# Patient Record
Sex: FEMALE | Race: WHITE
[De-identification: names, ages, dates, MRNs, and addresses within clinical notes are randomized per-mention and may not be internally consistent; named-entity substitution may affect disease eponyms.]

---

## 2019-08-15 ENCOUNTER — HOSPITAL ENCOUNTER (OUTPATIENT)
Dept: HOSPITAL 8 - CFH | Age: 54
Discharge: HOME | End: 2019-08-15
Attending: ORTHOPAEDIC SURGERY
Payer: COMMERCIAL
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
## 2019-08-15 DIAGNOSIS — M22.41: ICD-10-CM
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
## 2019-08-15 DIAGNOSIS — S83.271A: ICD-10-CM

## 2019-08-15 DIAGNOSIS — S83.231A: Primary | ICD-10-CM
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
## 2019-08-23 ENCOUNTER — HOSPITAL ENCOUNTER (OUTPATIENT)
Facility: MEDICAL CENTER | Age: 54
End: 2019-08-23
Attending: ORTHOPAEDIC SURGERY | Admitting: ORTHOPAEDIC SURGERY
Payer: COMMERCIAL

## 2019-08-28 ENCOUNTER — APPOINTMENT (OUTPATIENT)
Dept: ADMISSIONS | Facility: MEDICAL CENTER | Age: 54
End: 2019-08-28
Attending: ORTHOPAEDIC SURGERY
Payer: COMMERCIAL

## 2019-08-28 VITALS — WEIGHT: 170.42 LBS | BODY MASS INDEX: 26.75 KG/M2 | HEIGHT: 67 IN

## 2019-08-28 DIAGNOSIS — Z01.812 PRE-OPERATIVE LABORATORY EXAMINATION: ICD-10-CM

## 2019-08-28 LAB
ERYTHROCYTE [DISTWIDTH] IN BLOOD BY AUTOMATED COUNT: 44.2 FL (ref 35.9–50)
HCT VFR BLD AUTO: 42.3 % (ref 37–47)
HGB BLD-MCNC: 14 G/DL (ref 12–16)
MCH RBC QN AUTO: 33.3 PG (ref 27–33)
MCHC RBC AUTO-ENTMCNC: 33.1 G/DL (ref 33.6–35)
MCV RBC AUTO: 100.5 FL (ref 81.4–97.8)
PLATELET # BLD AUTO: 329 K/UL (ref 164–446)
PMV BLD AUTO: 9.7 FL (ref 9–12.9)
RBC # BLD AUTO: 4.21 M/UL (ref 4.2–5.4)
WBC # BLD AUTO: 5.9 K/UL (ref 4.8–10.8)

## 2019-08-28 PROCEDURE — 85027 COMPLETE CBC AUTOMATED: CPT

## 2019-08-28 PROCEDURE — 36415 COLL VENOUS BLD VENIPUNCTURE: CPT

## 2019-08-28 RX ORDER — IBUPROFEN 200 MG
200 TABLET ORAL EVERY 6 HOURS PRN
Status: ON HOLD | COMMUNITY
End: 2023-08-11

## 2019-08-28 RX ORDER — MULTIVIT WITH MINERALS/LUTEIN
TABLET ORAL DAILY
COMMUNITY

## 2019-08-28 RX ORDER — PNV NO.95/FERROUS FUM/FOLIC AC 28MG-0.8MG
TABLET ORAL DAILY
COMMUNITY

## 2019-08-28 RX ORDER — LEVOTHYROXINE SODIUM 0.1 MG/1
TABLET ORAL
COMMUNITY
Start: 2017-06-27

## 2020-07-09 ENCOUNTER — APPOINTMENT (RX ONLY)
Dept: URBAN - METROPOLITAN AREA CLINIC 35 | Facility: CLINIC | Age: 55
Setting detail: DERMATOLOGY
End: 2020-07-09

## 2020-07-09 PROBLEM — D23.72 OTHER BENIGN NEOPLASM OF SKIN OF LEFT LOWER LIMB, INCLUDING HIP: Status: ACTIVE | Noted: 2020-07-09

## 2020-07-09 PROCEDURE — 99212 OFFICE O/P EST SF 10 MIN: CPT

## 2020-07-09 PROCEDURE — ? COUNSELING

## 2020-07-09 PROCEDURE — ? ADDITIONAL NOTES

## 2020-07-09 NOTE — PROCEDURE: ADDITIONAL NOTES
Detail Level: Simple
Additional Notes: Advised to watch for irritation to resolve, call if not improving

## 2020-08-31 ENCOUNTER — APPOINTMENT (RX ONLY)
Dept: URBAN - METROPOLITAN AREA CLINIC 35 | Facility: CLINIC | Age: 55
Setting detail: DERMATOLOGY
End: 2020-08-31

## 2020-08-31 DIAGNOSIS — L82.1 OTHER SEBORRHEIC KERATOSIS: ICD-10-CM

## 2020-08-31 DIAGNOSIS — Z71.89 OTHER SPECIFIED COUNSELING: ICD-10-CM

## 2020-08-31 DIAGNOSIS — D22 MELANOCYTIC NEVI: ICD-10-CM

## 2020-08-31 DIAGNOSIS — L81.4 OTHER MELANIN HYPERPIGMENTATION: ICD-10-CM

## 2020-08-31 PROBLEM — D22.5 MELANOCYTIC NEVI OF TRUNK: Status: ACTIVE | Noted: 2020-08-31

## 2020-08-31 PROCEDURE — 99213 OFFICE O/P EST LOW 20 MIN: CPT

## 2020-08-31 PROCEDURE — ? COUNSELING

## 2020-08-31 ASSESSMENT — LOCATION ZONE DERM: LOCATION ZONE: TRUNK

## 2020-08-31 ASSESSMENT — LOCATION DETAILED DESCRIPTION DERM
LOCATION DETAILED: RIGHT SUPERIOR UPPER BACK
LOCATION DETAILED: RIGHT MID-UPPER BACK

## 2020-08-31 ASSESSMENT — LOCATION SIMPLE DESCRIPTION DERM: LOCATION SIMPLE: RIGHT UPPER BACK

## 2021-03-15 DIAGNOSIS — Z23 NEED FOR VACCINATION: ICD-10-CM

## 2021-09-09 ENCOUNTER — APPOINTMENT (RX ONLY)
Dept: URBAN - METROPOLITAN AREA CLINIC 35 | Facility: CLINIC | Age: 56
Setting detail: DERMATOLOGY
End: 2021-09-09

## 2021-09-09 DIAGNOSIS — L81.4 OTHER MELANIN HYPERPIGMENTATION: ICD-10-CM

## 2021-09-09 DIAGNOSIS — D22 MELANOCYTIC NEVI: ICD-10-CM

## 2021-09-09 DIAGNOSIS — Z71.89 OTHER SPECIFIED COUNSELING: ICD-10-CM

## 2021-09-09 DIAGNOSIS — L82.1 OTHER SEBORRHEIC KERATOSIS: ICD-10-CM

## 2021-09-09 PROBLEM — D22.5 MELANOCYTIC NEVI OF TRUNK: Status: ACTIVE | Noted: 2021-09-09

## 2021-09-09 PROCEDURE — 99213 OFFICE O/P EST LOW 20 MIN: CPT

## 2021-09-09 PROCEDURE — ? COUNSELING

## 2021-09-09 ASSESSMENT — LOCATION SIMPLE DESCRIPTION DERM: LOCATION SIMPLE: RIGHT UPPER BACK

## 2021-09-09 ASSESSMENT — LOCATION DETAILED DESCRIPTION DERM
LOCATION DETAILED: RIGHT MID-UPPER BACK
LOCATION DETAILED: RIGHT SUPERIOR UPPER BACK

## 2021-09-09 ASSESSMENT — LOCATION ZONE DERM: LOCATION ZONE: TRUNK

## 2022-09-14 ENCOUNTER — APPOINTMENT (RX ONLY)
Dept: URBAN - METROPOLITAN AREA CLINIC 35 | Facility: CLINIC | Age: 57
Setting detail: DERMATOLOGY
End: 2022-09-14

## 2022-09-14 DIAGNOSIS — Z71.89 OTHER SPECIFIED COUNSELING: ICD-10-CM

## 2022-09-14 DIAGNOSIS — L81.4 OTHER MELANIN HYPERPIGMENTATION: ICD-10-CM

## 2022-09-14 DIAGNOSIS — D22 MELANOCYTIC NEVI: ICD-10-CM

## 2022-09-14 DIAGNOSIS — L82.1 OTHER SEBORRHEIC KERATOSIS: ICD-10-CM

## 2022-09-14 DIAGNOSIS — L24 IRRITANT CONTACT DERMATITIS: ICD-10-CM

## 2022-09-14 PROBLEM — L24.9 IRRITANT CONTACT DERMATITIS, UNSPECIFIED CAUSE: Status: ACTIVE | Noted: 2022-09-14

## 2022-09-14 PROBLEM — D22.5 MELANOCYTIC NEVI OF TRUNK: Status: ACTIVE | Noted: 2022-09-14

## 2022-09-14 PROCEDURE — 99213 OFFICE O/P EST LOW 20 MIN: CPT

## 2022-09-14 PROCEDURE — ? COUNSELING

## 2022-09-14 PROCEDURE — ? PRESCRIPTION

## 2022-09-14 PROCEDURE — ? ADDITIONAL NOTES

## 2022-09-14 RX ORDER — TRIAMCINOLONE ACETONIDE 1 MG/G
1 CREAM TOPICAL BID
Qty: 80 | Refills: 1 | Status: ERX | COMMUNITY
Start: 2022-09-14

## 2022-09-14 RX ADMIN — TRIAMCINOLONE ACETONIDE 1: 1 CREAM TOPICAL at 00:00

## 2022-09-14 ASSESSMENT — LOCATION DETAILED DESCRIPTION DERM
LOCATION DETAILED: LEFT BUTTOCK
LOCATION DETAILED: RIGHT PROXIMAL POSTERIOR THIGH
LOCATION DETAILED: RIGHT SUPERIOR UPPER BACK
LOCATION DETAILED: LEFT PROXIMAL PRETIBIAL REGION
LOCATION DETAILED: LEFT ANTERIOR DISTAL THIGH
LOCATION DETAILED: RIGHT DISTAL LATERAL CALF
LOCATION DETAILED: RIGHT BUTTOCK
LOCATION DETAILED: RIGHT MID-UPPER BACK
LOCATION DETAILED: RIGHT ANTERIOR DISTAL THIGH
LOCATION DETAILED: LEFT LATERAL ABDOMEN
LOCATION DETAILED: RIGHT DISTAL PRETIBIAL REGION
LOCATION DETAILED: LEFT DISTAL POSTERIOR THIGH
LOCATION DETAILED: LEFT PROXIMAL CALF

## 2022-09-14 ASSESSMENT — LOCATION SIMPLE DESCRIPTION DERM
LOCATION SIMPLE: LEFT BUTTOCK
LOCATION SIMPLE: LEFT THIGH
LOCATION SIMPLE: ABDOMEN
LOCATION SIMPLE: RIGHT BUTTOCK
LOCATION SIMPLE: RIGHT UPPER BACK
LOCATION SIMPLE: LEFT PRETIBIAL REGION
LOCATION SIMPLE: RIGHT THIGH
LOCATION SIMPLE: LEFT CALF
LOCATION SIMPLE: RIGHT POSTERIOR THIGH
LOCATION SIMPLE: RIGHT CALF
LOCATION SIMPLE: LEFT POSTERIOR THIGH
LOCATION SIMPLE: RIGHT PRETIBIAL REGION

## 2022-09-14 ASSESSMENT — LOCATION ZONE DERM
LOCATION ZONE: TRUNK
LOCATION ZONE: LEG

## 2022-09-14 NOTE — PROCEDURE: ADDITIONAL NOTES
Render Risk Assessment In Note?: no
Detail Level: Simple
Additional Notes: If persist plsn to bx ddx rash nos.  This is macular pink rash on her legs just where she wore her jeans.  It seems to be getting better since she stopped wearing the new jeans.

## 2023-08-01 ENCOUNTER — APPOINTMENT (OUTPATIENT)
Dept: ADMISSIONS | Facility: MEDICAL CENTER | Age: 58
End: 2023-08-01
Attending: SURGERY
Payer: COMMERCIAL

## 2023-08-02 ENCOUNTER — HOSPITAL ENCOUNTER (OUTPATIENT)
Facility: MEDICAL CENTER | Age: 58
End: 2023-08-02
Attending: SURGERY
Payer: COMMERCIAL

## 2023-08-02 LAB — PATHOLOGY CONSULT NOTE: NORMAL

## 2023-08-04 ENCOUNTER — PATIENT OUTREACH (OUTPATIENT)
Dept: OTHER | Facility: MEDICAL CENTER | Age: 58
End: 2023-08-04
Payer: COMMERCIAL

## 2023-08-04 NOTE — LETTER
Ty BAUMAN [facility]    [address]  [place] [address]  Phone: [phone] - Fax: [phone]              Diya Thu Henry  [address]  [place] [address]     Date: 08/04/23  Medical Record Number: [id number]    Dear Diya,    I am a Cancer Nurse Navigator, a certified oncology nurse. My role is to assess any needs you may have with education, guidance and support. I am available to you and your family through your treatment at [facility].       I am available to address your needs during your journey with the following services:     Care Coordination  I can assist you in facilitating communication between your cancer care treatment team to ensure timely treatment and follow-up.  I can also assist with transition of care back to your primary care provider, or other specialist, as needed.  My goal is to bridge gaps for you throughout the course of your active treatment.       Education Services  Understanding the recommended treatment course by your physician is key. I can provide educational resources personalized to your cancer diagnosis to help you understand your diagnosis and treatment. Please let me know if you would like to receive information about your diagnosis and treatment plan.  I am here to help.     Support Services/Resource Information  [facility] we offer a full scope of support services.  I can assist you with referral information to:  Cancer Clinical Trials & Research  Nutrition counseling  Support groups  Complementary Therapies such as Mind-Body Techniques Meditation  Patient Financial Advocates    [facility], an [employer] affiliate office, our volunteers can assist you with accessing our [employer] library, support services information, head coverings and comfort items  Community and national resources, included eligibility based will assistance and pharmaceutical access programs, if you are in need of  additional information.     [facility] offers services that include:  Behavioral Health  Genetic counseling & testing  Lymphedema prevention/treatment program  Palliative care services.        I hope you have an excellent patient experience.  Please feel free to share with me your comments regarding the care you have received- we value your feedback.    Sincerely,     Deysi Blake R.N.  Cancer Nurse Navigator    Office: [phone] / [phone]   Email:  [email or web address]    If you would like to attend our Breast Cancer Newly Diagnosed Class please call [phone] or visit [email or web address] to register.

## 2023-08-04 NOTE — PROGRESS NOTES
Oncology Nurse Navigation  Phoned for follow up.  No answer, left message.   Intro letter sent via My Chart.  Resources mailed.

## 2023-08-07 ENCOUNTER — HOSPITAL ENCOUNTER (OUTPATIENT)
Dept: RADIOLOGY | Facility: MEDICAL CENTER | Age: 58
End: 2023-08-07

## 2023-08-07 ENCOUNTER — HOSPITAL ENCOUNTER (OUTPATIENT)
Dept: RADIOLOGY | Facility: MEDICAL CENTER | Age: 58
End: 2023-08-07
Attending: OBSTETRICS & GYNECOLOGY

## 2023-08-07 ENCOUNTER — PRE-ADMISSION TESTING (OUTPATIENT)
Dept: ADMISSIONS | Facility: MEDICAL CENTER | Age: 58
End: 2023-08-07
Attending: SURGERY
Payer: COMMERCIAL

## 2023-08-07 DIAGNOSIS — Z01.812 PRE-PROCEDURAL LABORATORY EXAMINATION: ICD-10-CM

## 2023-08-07 RX ORDER — ESKETAMINE HYDROCHLORIDE 28 MG/.2ML
SOLUTION NASAL
COMMUNITY
Start: 2023-08-03

## 2023-08-07 RX ORDER — FLUOXETINE HYDROCHLORIDE 40 MG/1
40 CAPSULE ORAL DAILY
COMMUNITY

## 2023-08-07 RX ORDER — ESKETAMINE HYDROCHLORIDE 28 MG/.2ML
SOLUTION NASAL
COMMUNITY
Start: 2023-05-23

## 2023-08-07 RX ORDER — FLUOXETINE HYDROCHLORIDE 20 MG/1
CAPSULE ORAL
COMMUNITY
Start: 2023-07-09

## 2023-08-09 ENCOUNTER — APPOINTMENT (OUTPATIENT)
Dept: ADMISSIONS | Facility: MEDICAL CENTER | Age: 58
End: 2023-08-09
Attending: SURGERY
Payer: COMMERCIAL

## 2023-08-09 DIAGNOSIS — Z01.812 PRE-PROCEDURAL LABORATORY EXAMINATION: ICD-10-CM

## 2023-08-09 LAB
ERYTHROCYTE [DISTWIDTH] IN BLOOD BY AUTOMATED COUNT: 41 FL (ref 35.9–50)
HCT VFR BLD AUTO: 41.2 % (ref 37–47)
HGB BLD-MCNC: 13.5 G/DL (ref 12–16)
MCH RBC QN AUTO: 30.3 PG (ref 27–33)
MCHC RBC AUTO-ENTMCNC: 32.8 G/DL (ref 32.2–35.5)
MCV RBC AUTO: 92.4 FL (ref 81.4–97.8)
PLATELET # BLD AUTO: 387 K/UL (ref 164–446)
PMV BLD AUTO: 8.9 FL (ref 9–12.9)
RBC # BLD AUTO: 4.46 M/UL (ref 4.2–5.4)
WBC # BLD AUTO: 8.9 K/UL (ref 4.8–10.8)

## 2023-08-09 PROCEDURE — 85027 COMPLETE CBC AUTOMATED: CPT

## 2023-08-09 PROCEDURE — 36415 COLL VENOUS BLD VENIPUNCTURE: CPT

## 2023-08-11 ENCOUNTER — APPOINTMENT (OUTPATIENT)
Dept: RADIOLOGY | Facility: MEDICAL CENTER | Age: 58
End: 2023-08-11
Attending: SURGERY
Payer: COMMERCIAL

## 2023-08-11 ENCOUNTER — ANESTHESIA (OUTPATIENT)
Dept: SURGERY | Facility: MEDICAL CENTER | Age: 58
End: 2023-08-11
Payer: COMMERCIAL

## 2023-08-11 ENCOUNTER — ANESTHESIA EVENT (OUTPATIENT)
Dept: SURGERY | Facility: MEDICAL CENTER | Age: 58
End: 2023-08-11
Payer: COMMERCIAL

## 2023-08-11 ENCOUNTER — HOSPITAL ENCOUNTER (OUTPATIENT)
Facility: MEDICAL CENTER | Age: 58
End: 2023-08-11
Attending: SURGERY | Admitting: SURGERY
Payer: COMMERCIAL

## 2023-08-11 VITALS
DIASTOLIC BLOOD PRESSURE: 55 MMHG | SYSTOLIC BLOOD PRESSURE: 101 MMHG | OXYGEN SATURATION: 95 % | TEMPERATURE: 97.2 F | WEIGHT: 182.32 LBS | BODY MASS INDEX: 29.3 KG/M2 | RESPIRATION RATE: 29 BRPM | HEART RATE: 76 BPM | HEIGHT: 66 IN

## 2023-08-11 DIAGNOSIS — Z17.0 MALIGNANT NEOPLASM OF UPPER-OUTER QUADRANT OF LEFT BREAST IN FEMALE, ESTROGEN RECEPTOR POSITIVE (HCC): ICD-10-CM

## 2023-08-11 DIAGNOSIS — G89.18 POSTOPERATIVE PAIN: ICD-10-CM

## 2023-08-11 DIAGNOSIS — C50.412 MALIGNANT NEOPLASM OF UPPER-OUTER QUADRANT OF LEFT BREAST IN FEMALE, ESTROGEN RECEPTOR POSITIVE (HCC): ICD-10-CM

## 2023-08-11 DIAGNOSIS — Z98.890 PONV (POSTOPERATIVE NAUSEA AND VOMITING): ICD-10-CM

## 2023-08-11 DIAGNOSIS — R11.2 PONV (POSTOPERATIVE NAUSEA AND VOMITING): ICD-10-CM

## 2023-08-11 PROBLEM — E03.9 HYPOTHYROIDISM: Status: ACTIVE | Noted: 2023-08-11

## 2023-08-11 LAB — PATHOLOGY CONSULT NOTE: NORMAL

## 2023-08-11 PROCEDURE — 700105 HCHG RX REV CODE 258: Performed by: ANESTHESIOLOGY

## 2023-08-11 PROCEDURE — 38792 RA TRACER ID OF SENTINL NODE: CPT | Mod: LT

## 2023-08-11 PROCEDURE — 700102 HCHG RX REV CODE 250 W/ 637 OVERRIDE(OP): Performed by: ANESTHESIOLOGY

## 2023-08-11 PROCEDURE — 700111 HCHG RX REV CODE 636 W/ 250 OVERRIDE (IP): Mod: JZ | Performed by: ANESTHESIOLOGY

## 2023-08-11 PROCEDURE — 88307 TISSUE EXAM BY PATHOLOGIST: CPT

## 2023-08-11 PROCEDURE — 160041 HCHG SURGERY MINUTES - EA ADDL 1 MIN LEVEL 4: Performed by: SURGERY

## 2023-08-11 PROCEDURE — 700102 HCHG RX REV CODE 250 W/ 637 OVERRIDE(OP): Performed by: SURGERY

## 2023-08-11 PROCEDURE — A9270 NON-COVERED ITEM OR SERVICE: HCPCS | Performed by: ANESTHESIOLOGY

## 2023-08-11 PROCEDURE — 160048 HCHG OR STATISTICAL LEVEL 1-5: Performed by: SURGERY

## 2023-08-11 PROCEDURE — 700101 HCHG RX REV CODE 250: Performed by: ANESTHESIOLOGY

## 2023-08-11 PROCEDURE — 160025 RECOVERY II MINUTES (STATS): Performed by: SURGERY

## 2023-08-11 PROCEDURE — A9270 NON-COVERED ITEM OR SERVICE: HCPCS | Performed by: SURGERY

## 2023-08-11 PROCEDURE — 160029 HCHG SURGERY MINUTES - 1ST 30 MINS LEVEL 4: Performed by: SURGERY

## 2023-08-11 PROCEDURE — 160035 HCHG PACU - 1ST 60 MINS PHASE I: Performed by: SURGERY

## 2023-08-11 PROCEDURE — 700101 HCHG RX REV CODE 250: Performed by: SURGERY

## 2023-08-11 PROCEDURE — 160046 HCHG PACU - 1ST 60 MINS PHASE II: Performed by: SURGERY

## 2023-08-11 PROCEDURE — 76098 X-RAY EXAM SURGICAL SPECIMEN: CPT | Mod: LT

## 2023-08-11 PROCEDURE — 160047 HCHG PACU  - EA ADDL 30 MINS PHASE II: Performed by: SURGERY

## 2023-08-11 PROCEDURE — 700111 HCHG RX REV CODE 636 W/ 250 OVERRIDE (IP): Performed by: ANESTHESIOLOGY

## 2023-08-11 PROCEDURE — 160009 HCHG ANES TIME/MIN: Performed by: SURGERY

## 2023-08-11 PROCEDURE — 700111 HCHG RX REV CODE 636 W/ 250 OVERRIDE (IP): Performed by: SURGERY

## 2023-08-11 PROCEDURE — 160002 HCHG RECOVERY MINUTES (STAT): Performed by: SURGERY

## 2023-08-11 RX ORDER — MEPERIDINE HYDROCHLORIDE 25 MG/ML
12.5 INJECTION INTRAMUSCULAR; INTRAVENOUS; SUBCUTANEOUS
Status: DISCONTINUED | OUTPATIENT
Start: 2023-08-11 | End: 2023-08-11 | Stop reason: HOSPADM

## 2023-08-11 RX ORDER — HYDROCODONE BITARTRATE AND ACETAMINOPHEN 5; 325 MG/1; MG/1
1 TABLET ORAL EVERY 4 HOURS PRN
Qty: 18 TABLET | Refills: 0 | Status: SHIPPED | OUTPATIENT
Start: 2023-08-11 | End: 2023-08-14

## 2023-08-11 RX ORDER — LIDOCAINE AND PRILOCAINE 25; 25 MG/G; MG/G
CREAM TOPICAL ONCE
Status: COMPLETED | OUTPATIENT
Start: 2023-08-11 | End: 2023-08-11

## 2023-08-11 RX ORDER — HYDROMORPHONE HYDROCHLORIDE 1 MG/ML
0.2 INJECTION, SOLUTION INTRAMUSCULAR; INTRAVENOUS; SUBCUTANEOUS
Status: DISCONTINUED | OUTPATIENT
Start: 2023-08-11 | End: 2023-08-11 | Stop reason: HOSPADM

## 2023-08-11 RX ORDER — LIDOCAINE HYDROCHLORIDE 20 MG/ML
INJECTION, SOLUTION EPIDURAL; INFILTRATION; INTRACAUDAL; PERINEURAL PRN
Status: DISCONTINUED | OUTPATIENT
Start: 2023-08-11 | End: 2023-08-11 | Stop reason: SURG

## 2023-08-11 RX ORDER — CELECOXIB 200 MG/1
400 CAPSULE ORAL ONCE
Status: COMPLETED | OUTPATIENT
Start: 2023-08-11 | End: 2023-08-11

## 2023-08-11 RX ORDER — BUPIVACAINE HYDROCHLORIDE 5 MG/ML
INJECTION, SOLUTION EPIDURAL; INTRACAUDAL
Status: DISCONTINUED
Start: 2023-08-11 | End: 2023-08-11 | Stop reason: HOSPADM

## 2023-08-11 RX ORDER — ONDANSETRON 4 MG/1
4 TABLET, FILM COATED ORAL EVERY 8 HOURS PRN
Qty: 9 EACH | Refills: 0 | Status: SHIPPED | OUTPATIENT
Start: 2023-08-11 | End: 2023-08-14

## 2023-08-11 RX ORDER — HYDROMORPHONE HYDROCHLORIDE 1 MG/ML
0.1 INJECTION, SOLUTION INTRAMUSCULAR; INTRAVENOUS; SUBCUTANEOUS
Status: DISCONTINUED | OUTPATIENT
Start: 2023-08-11 | End: 2023-08-11 | Stop reason: HOSPADM

## 2023-08-11 RX ORDER — BUPIVACAINE HYDROCHLORIDE AND EPINEPHRINE 5; 5 MG/ML; UG/ML
INJECTION, SOLUTION EPIDURAL; INTRACAUDAL; PERINEURAL
Status: DISCONTINUED | OUTPATIENT
Start: 2023-08-11 | End: 2023-08-11 | Stop reason: HOSPADM

## 2023-08-11 RX ORDER — ISOSULFAN BLUE 50 MG/5ML
INJECTION, SOLUTION SUBCUTANEOUS
Status: DISCONTINUED | OUTPATIENT
Start: 2023-08-11 | End: 2023-08-11 | Stop reason: HOSPADM

## 2023-08-11 RX ORDER — SODIUM CHLORIDE, SODIUM LACTATE, POTASSIUM CHLORIDE, CALCIUM CHLORIDE 600; 310; 30; 20 MG/100ML; MG/100ML; MG/100ML; MG/100ML
INJECTION, SOLUTION INTRAVENOUS
Status: DISCONTINUED | OUTPATIENT
Start: 2023-08-11 | End: 2023-08-11 | Stop reason: SURG

## 2023-08-11 RX ORDER — SCOLOPAMINE TRANSDERMAL SYSTEM 1 MG/1
1 PATCH, EXTENDED RELEASE TRANSDERMAL
Status: DISCONTINUED | OUTPATIENT
Start: 2023-08-11 | End: 2023-08-11 | Stop reason: HOSPADM

## 2023-08-11 RX ORDER — ONDANSETRON 2 MG/ML
INJECTION INTRAMUSCULAR; INTRAVENOUS PRN
Status: DISCONTINUED | OUTPATIENT
Start: 2023-08-11 | End: 2023-08-11 | Stop reason: SURG

## 2023-08-11 RX ORDER — HYDROMORPHONE HYDROCHLORIDE 1 MG/ML
0.4 INJECTION, SOLUTION INTRAMUSCULAR; INTRAVENOUS; SUBCUTANEOUS
Status: DISCONTINUED | OUTPATIENT
Start: 2023-08-11 | End: 2023-08-11 | Stop reason: HOSPADM

## 2023-08-11 RX ORDER — ACETAMINOPHEN 500 MG
1000 TABLET ORAL ONCE
Status: COMPLETED | OUTPATIENT
Start: 2023-08-11 | End: 2023-08-11

## 2023-08-11 RX ORDER — ALPRAZOLAM 0.25 MG/1
.25-.5 TABLET ORAL
Status: COMPLETED | OUTPATIENT
Start: 2023-08-11 | End: 2023-08-11

## 2023-08-11 RX ORDER — SODIUM CHLORIDE, SODIUM LACTATE, POTASSIUM CHLORIDE, CALCIUM CHLORIDE 600; 310; 30; 20 MG/100ML; MG/100ML; MG/100ML; MG/100ML
INJECTION, SOLUTION INTRAVENOUS CONTINUOUS
Status: DISCONTINUED | OUTPATIENT
Start: 2023-08-11 | End: 2023-08-11 | Stop reason: HOSPADM

## 2023-08-11 RX ORDER — OXYCODONE HCL 5 MG/5 ML
5 SOLUTION, ORAL ORAL
Status: COMPLETED | OUTPATIENT
Start: 2023-08-11 | End: 2023-08-11

## 2023-08-11 RX ORDER — CEFAZOLIN SODIUM 1 G/3ML
INJECTION, POWDER, FOR SOLUTION INTRAMUSCULAR; INTRAVENOUS PRN
Status: DISCONTINUED | OUTPATIENT
Start: 2023-08-11 | End: 2023-08-11 | Stop reason: SURG

## 2023-08-11 RX ORDER — ONDANSETRON 2 MG/ML
4 INJECTION INTRAMUSCULAR; INTRAVENOUS
Status: DISCONTINUED | OUTPATIENT
Start: 2023-08-11 | End: 2023-08-11 | Stop reason: HOSPADM

## 2023-08-11 RX ORDER — DEXAMETHASONE SODIUM PHOSPHATE 4 MG/ML
INJECTION, SOLUTION INTRA-ARTICULAR; INTRALESIONAL; INTRAMUSCULAR; INTRAVENOUS; SOFT TISSUE PRN
Status: DISCONTINUED | OUTPATIENT
Start: 2023-08-11 | End: 2023-08-11 | Stop reason: SURG

## 2023-08-11 RX ORDER — OXYCODONE HCL 5 MG/5 ML
10 SOLUTION, ORAL ORAL
Status: COMPLETED | OUTPATIENT
Start: 2023-08-11 | End: 2023-08-11

## 2023-08-11 RX ORDER — HALOPERIDOL 5 MG/ML
1 INJECTION INTRAMUSCULAR
Status: DISCONTINUED | OUTPATIENT
Start: 2023-08-11 | End: 2023-08-11 | Stop reason: HOSPADM

## 2023-08-11 RX ORDER — ISOSULFAN BLUE 50 MG/5ML
INJECTION, SOLUTION SUBCUTANEOUS
Status: DISCONTINUED
Start: 2023-08-11 | End: 2023-08-11 | Stop reason: HOSPADM

## 2023-08-11 RX ORDER — ALPRAZOLAM 0.25 MG/1
0.25 TABLET ORAL ONCE
Status: COMPLETED | OUTPATIENT
Start: 2023-08-11 | End: 2023-08-11

## 2023-08-11 RX ADMIN — FENTANYL CITRATE 50 MCG: 50 INJECTION, SOLUTION INTRAMUSCULAR; INTRAVENOUS at 15:01

## 2023-08-11 RX ADMIN — ALPRAZOLAM 0.25 MG: 0.25 TABLET ORAL at 13:33

## 2023-08-11 RX ADMIN — FENTANYL CITRATE 50 MCG: 50 INJECTION, SOLUTION INTRAMUSCULAR; INTRAVENOUS at 14:17

## 2023-08-11 RX ADMIN — DEXAMETHASONE SODIUM PHOSPHATE 8 MG: 4 INJECTION INTRA-ARTICULAR; INTRALESIONAL; INTRAMUSCULAR; INTRAVENOUS; SOFT TISSUE at 14:23

## 2023-08-11 RX ADMIN — ALPRAZOLAM 0.25 MG: 0.25 TABLET ORAL at 10:06

## 2023-08-11 RX ADMIN — LIDOCAINE HYDROCHLORIDE 70 MG: 20 INJECTION, SOLUTION EPIDURAL; INFILTRATION; INTRACAUDAL at 14:17

## 2023-08-11 RX ADMIN — OXYCODONE HYDROCHLORIDE 5 MG: 5 SOLUTION ORAL at 16:15

## 2023-08-11 RX ADMIN — ACETAMINOPHEN 1000 MG: 500 TABLET, FILM COATED ORAL at 12:41

## 2023-08-11 RX ADMIN — CELECOXIB 400 MG: 200 CAPSULE ORAL at 12:41

## 2023-08-11 RX ADMIN — FENTANYL CITRATE 25 MCG: 50 INJECTION, SOLUTION INTRAMUSCULAR; INTRAVENOUS at 16:16

## 2023-08-11 RX ADMIN — CEFAZOLIN 2 G: 1 INJECTION, POWDER, FOR SOLUTION INTRAMUSCULAR; INTRAVENOUS at 14:17

## 2023-08-11 RX ADMIN — PROPOFOL 120 MG: 10 INJECTION, EMULSION INTRAVENOUS at 14:17

## 2023-08-11 RX ADMIN — FENTANYL CITRATE 25 MCG: 50 INJECTION, SOLUTION INTRAMUSCULAR; INTRAVENOUS at 15:40

## 2023-08-11 RX ADMIN — SCOPOLAMINE 1 PATCH: 1.5 PATCH, EXTENDED RELEASE TRANSDERMAL at 10:02

## 2023-08-11 RX ADMIN — Medication 35 MG: at 14:22

## 2023-08-11 RX ADMIN — SODIUM CHLORIDE, POTASSIUM CHLORIDE, SODIUM LACTATE AND CALCIUM CHLORIDE: 600; 310; 30; 20 INJECTION, SOLUTION INTRAVENOUS at 14:12

## 2023-08-11 RX ADMIN — ONDANSETRON 4 MG: 2 INJECTION INTRAMUSCULAR; INTRAVENOUS at 15:35

## 2023-08-11 RX ADMIN — SODIUM CHLORIDE, POTASSIUM CHLORIDE, SODIUM LACTATE AND CALCIUM CHLORIDE: 600; 310; 30; 20 INJECTION, SOLUTION INTRAVENOUS at 15:56

## 2023-08-11 RX ADMIN — LIDOCAINE AND PRILOCAINE 1 TUBE: 25; 25 CREAM TOPICAL at 09:42

## 2023-08-11 RX ADMIN — FENTANYL CITRATE 25 MCG: 50 INJECTION, SOLUTION INTRAMUSCULAR; INTRAVENOUS at 15:58

## 2023-08-11 RX ADMIN — FENTANYL CITRATE 50 MCG: 50 INJECTION, SOLUTION INTRAMUSCULAR; INTRAVENOUS at 14:53

## 2023-08-11 RX ADMIN — FENTANYL CITRATE 25 MCG: 50 INJECTION, SOLUTION INTRAMUSCULAR; INTRAVENOUS at 16:53

## 2023-08-11 RX ADMIN — MIDAZOLAM 2 MG: 1 INJECTION, SOLUTION INTRAMUSCULAR; INTRAVENOUS at 14:13

## 2023-08-11 ASSESSMENT — PAIN DESCRIPTION - PAIN TYPE: TYPE: SURGICAL PAIN

## 2023-08-11 ASSESSMENT — FIBROSIS 4 INDEX: FIB4 SCORE: 0.76

## 2023-08-11 ASSESSMENT — PAIN SCALES - GENERAL: PAIN_LEVEL: 4

## 2023-08-11 NOTE — OR NURSING
1603- pt arrived to PACU, report received.  Pt on 4L mask.  L breast with dressing, CDI.     1616- pt with pain 4/10 to L breast.  Medicated per MAR.      1653- Pain 6/10, medicated per MAR.     1658- pt having saltines, phase 2 met.    1734- pts  brought back to bedside.  Discharge instructions discussed and all questions answered at this time.      1747- Pt getting dressed.      1800- IV dc with tip intact.  Pt dc home via wheelchair with all belongings.

## 2023-08-11 NOTE — ANESTHESIA TIME REPORT
Anesthesia Start and Stop Event Times     Date Time Event    8/11/2023 1403 Ready for Procedure     1412 Anesthesia Start     1605 Anesthesia Stop        Responsible Staff  08/11/23    Name Role Begin End    Margarita Varghese M.D. Anesth 1412 1605        Overtime Reason:  no overtime (within assigned shift)    Comments:

## 2023-08-11 NOTE — ANESTHESIA POSTPROCEDURE EVALUATION
Patient: Diya Henry    Procedure Summary     Date: 08/11/23 Room / Location: [facility] ROOM 28 / SURGERY SAME DAY [facility]    Anesthesia Start: 1412 Anesthesia Stop: 1605    Procedures:       EVERTON LOCALIZED LEFT PARTIAL MASTECTOMY,  MASTOPEXY, LEFT SENTINEL LYMPH NODE INJECTION WITH EXCISION OF DEEP AXILLARY NODES (Left: Breast)      MASTOPEXY (Left: Breast)      BIOPSY, LYMPH NODE, SENTINEL (Left: Breast) Diagnosis: (LEFT BREAST CANCER)    Surgeons: Heather Weiner M.D. Responsible Provider: Margarita Varghese M.D.    Anesthesia Type: general ASA Status: 2          Final Anesthesia Type: general  Last vitals  BP   Blood Pressure: 121/75    Temp   36.2 °C (97.2 °F)    Pulse   77   Resp   (!) 23    SpO2   98 %      Anesthesia Post Evaluation    Patient location during evaluation: PACU  Patient participation: complete - patient participated  Level of consciousness: awake  Pain score: 4    Airway patency: patent  Anesthetic complications: no  Cardiovascular status: hemodynamically stable  Respiratory status: acceptable  Hydration status: acceptable    PONV: none          No notable events documented.     Nurse Pain Score: 0 (NPRS)

## 2023-08-11 NOTE — OP REPORT
Operative Report    Date: 8/11/2023      Pre-operative Diagnosis: Malignant neoplasm left upper outer breast    Post-operative Diagnosis: Same    Procedure:   EVERTON loc left partial mastectomy with lattice  Donut mastopexy  Left sentinel lymph node mapping with axillary reverse mapping and excision of deep axillary nodes      Surgeon: Heather Weiner M.D.    Assistant: None    Anesthesiologist: Margarita Varghese MD      Anesthesia:  General    Pre-Op Meds:  Ancef    ASA class:  2    Indications:   This is a 58 year old female with left breast cancer, cT1 cN0 M0.  After discussing risks and benefits of her options, she is ready to proceed with surgery.    Findings:   Two sentinel nodes, counts to 3600.    EVERTON present.  Margins grossly clear.     Summary:   The patient underwent radiotracer injection by Radiology.  She was taken to the operating room and anesthetized, then prepped and draped in sterile fashion.  Time out was confirmed.  Local anesthetic was injected prior to all incisions.  For axillary reverse mapping, 3cc of isosulfan blue dye and 7cc of saline was injected into the upper inner arm, and lymphatic massage was performed.    I started with a small axillary incision following her skin lines.  The clavipectoral fascia was incised and the gamma probe was used to locate the sentinel nodes which were excised using the harmonic scalpel and suture ligated to reapproximate afferent and efferent lymphatics. No blue arm lymphatics were identified.  Background counts were less than 10% and there were no suspicious palpable nodes.  I irrigated and ensured hemostasis. I closed the fascia and deep dermal layer, then 4-0 monocryl was used for skin.      I then made a circumareolar incision, followed by a wider concentric incision.  I de-epithelialized in between, then dissected along the anterior mammary fascial plane to the area localized in the upper outer breast.  I excised widely around the somewhat posterior area  of interest, exposing the pectoralis muscle focally on the medial aspect of the posterior margin.  I placed usual orienting sutures and specimen mammogram was performed.    I irrigated and ensured hemostasis.  Small clips were placed at the periphery of the posterior margin.  Pathology evaluated the specimen grossly for margins.  I mobilized additional tissue medially to minimize dimpling.  I ensured hemostasis, then placed small clips to noelle the edges of the cavity.  I used a 2-0 PDS to create a two layer lattice.      The larger outer incision was advanced with adjacent vascularized skin to the inner incision to decrease the breast envelope and minimize skin rippling.  3-0 vicryls were used for buried interrupted, and 4-0 Fiberwire was used for purse string at the deep dermal layer.  4-0 Stratafix was used for skin.  Dressings were placed and I was informed all counts were correct.       [place] Node Biopsy for Breast Cancer  Operation performed with curative intent Yes   Tracer(s) used to identify sentinel nodes in the upfront surgery (non-neoadjuvant) setting Radioactive tracer   Tracer(s) used to identify sentinel nodes in the neoadjuvant setting N/A   All nodes (colored or non-colored) present at the end of a dye-filled lymphatic channel were removed N/A   All significantly radioactive nodes were removed Yes   All palpably suspicious nodes were removed N/A   Biopsy-proven positive nodes marked with clips prior to chemotherapy were identified and removed N/A           CC:   MD Slick North MD Sowjanya Reganti, MD

## 2023-08-11 NOTE — ANESTHESIA PREPROCEDURE EVALUATION
Case: [id number] Anesthesia Start Date/Time: 08/11/23 1412    Procedures:       EVERTON LOCALIZED LEFT PARTIAL MASTECTOMY, POSSIBLE MASTOPEXY, LEFT SENTINEL LYMPH NODE INJECTION WITH EXCISION OF DEEP AXILLARY NODES (Left: Breast)      MASTOPEXY (Left: Breast)      BIOPSY, LYMPH NODE, SENTINEL (Left: Breast)    Anesthesia type: General    Pre-op diagnosis: LEFT BREAST CANCER    Location: [facility] ROOM 28 / SURGERY SAME DAY [facility]    Surgeons: Heather Weiner M.D.        57 yo w/left breast cancer    Relevant Problems   ANESTHESIA   (positive) PONV (postoperative nausea and vomiting)      ENDO   (positive) Hypothyroidism       Physical Exam    Airway   Mallampati: II  TM distance: >3 FB  Neck ROM: full       Cardiovascular   Rhythm: regular  Rate: normal  (-) murmur     Dental - normal exam           Pulmonary   Breath sounds clear to auscultation     Abdominal    Neurological - normal exam                 Anesthesia Plan    ASA 2       Plan - general       Airway plan will be LMA          Induction: intravenous    Postoperative Plan: Postoperative administration of opioids is intended.    Pertinent diagnostic labs and testing reviewed    Informed Consent:    Anesthetic plan and risks discussed with patient.

## 2023-08-12 NOTE — DISCHARGE INSTRUCTIONS
HOME CARE INSTRUCTIONS    ACTIVITY: Rest and take it easy for the first 24 hours.  A responsible adult is recommended to remain with you during that time.  It is normal to feel sleepy.  We encourage you to not do anything that requires balance, judgment or coordination.    FOR 24 HOURS DO NOT:  Drive, operate machinery or run household appliances.  Drink beer or alcoholic beverages.  Make important decisions or sign legal documents.    SPECIAL INSTRUCTIONS:   Wear binder over sports bra for compression 24/7 except when showering.  Walk frequently.  Avoid strenuous arm activity.  Follow the [facility] post op instructions given to you by Dr. Weiner at your pre-op appointment.      DIET: To avoid nausea, slowly advance diet as tolerated, avoiding spicy or greasy foods for the first day.  Add more substantial food to your diet according to your physician's instructions.  Babies can be fed formula or breast milk as soon as they are hungry.  INCREASE FLUIDS AND FIBER TO AVOID CONSTIPATION.    SURGICAL DRESSING/BATHING: May shower tomorrow, but no baths or swimming for four weeks.    MEDICATIONS: Resume taking daily medication.  Take prescribed pain medication with food.  If no medication is prescribed, you may take non-aspirin pain medication if needed.  PAIN MEDICATION CAN BE VERY CONSTIPATING.  Take a stool softener or laxative such as senokot, pericolace, or milk of magnesia if needed.    Prescription given for Zofran and Norco (do not take additional Tylenol while using this med).  Last pain medication given: You had Tylenol and Celebrex at 12:40pm.  OK to take Motrin again at 8:40pm.  5 mg Oxycodone given at 4:15pm.      You have a scopolamine patch behind your ear to help with nausea.  This can stay on for up to 3 days, but can be removed early if the side effects are too bad (dry mouth, blurry vision, confusion, mood swings).  Make sure to wash your hands and the spot behind your ear well with soap and water after throwing  the patch in the trash.      A follow-up appointment should be arranged with your doctor; call to schedule.    You should CALL YOUR PHYSICIAN if you develop:  Fever greater than 101 degrees F.  Pain not relieved by medication, or persistent nausea or vomiting.  Excessive bleeding (blood soaking through dressing) or unexpected drainage from the wound.  Extreme redness or swelling around the incision site, drainage of pus or foul smelling drainage.  Inability to urinate or empty your bladder within 8 hours.  Problems with breathing or chest pain.    You should call 911 if you develop problems with breathing or chest pain.  If you are unable to contact your doctor or surgical center, you should go to the nearest emergency room or urgent care center.  Physician's telephone #: Dr. Weiner [phone]    MILD FLU-LIKE SYMPTOMS ARE NORMAL.  YOU MAY EXPERIENCE GENERALIZED MUSCLE ACHES, THROAT IRRITATION, HEADACHE AND/OR SOME NAUSEA.    If any questions arise, call your doctor.  If your doctor is not available, please feel free to call the Surgical Center at [phone].  The Center is open Monday through Friday from 7AM to 7PM.      A registered nurse may call you a few days after your surgery to see how you are doing after your procedure.    You may also receive a survey in the mail within the next two weeks and we ask that you take a few moments to complete the survey and return it to us.  Our goal is to provide you with very good care and we value your comments.     Depression / Suicide Risk    As you are discharged from this [facility] facility, it is important to learn how to keep safe from harming yourself.    Recognize the warning signs:  Abrupt changes in personality, positive or negative- including increase in energy   Giving away possessions  Change in eating patterns- significant weight changes-  positive or negative  Change in sleeping patterns- unable to sleep or sleeping all the time   Unwillingness or  inability to communicate  Depression  Unusual sadness, discouragement and loneliness  Talk of wanting to die  Neglect of personal appearance   Rebelliousness- reckless behavior  Withdrawal from people/activities they love  Confusion- inability to concentrate     If you or a loved one observes any of these behaviors or has concerns about self-harm, here's what you can do:  Talk about it- your feelings and reasons for harming yourself  Remove any means that you might use to hurt yourself (examples: pills, rope, extension cords, firearm)  Get professional help from the community (Mental Health, Substance Abuse, psychological counseling)  Do not be alone:Call your Safe Contact- someone whom you trust who will be there for you.  Call your local CRISIS HOTLINE 136-5147 or 045-197-1619  Call your local Children's Mobile Crisis Response Team [place] (883) 625-9270 or www.[employer]  Call the toll free National Suicide Prevention Hotlines   National Suicide Prevention Lifeline 467-764-JWDJ (2996)  National Hope Line Network 800-SUICIDE (853-6368)    I acknowledge receipt and understanding of these Home Care instructions.

## 2023-09-05 ENCOUNTER — PATIENT OUTREACH (OUTPATIENT)
Dept: OTHER | Facility: MEDICAL CENTER | Age: 58
End: 2023-09-05
Payer: COMMERCIAL

## 2023-09-14 ENCOUNTER — APPOINTMENT (RX ONLY)
Dept: URBAN - METROPOLITAN AREA CLINIC 35 | Facility: CLINIC | Age: 58
Setting detail: DERMATOLOGY
End: 2023-09-14

## 2023-09-14 DIAGNOSIS — D22 MELANOCYTIC NEVI: ICD-10-CM

## 2023-09-14 DIAGNOSIS — L82.1 OTHER SEBORRHEIC KERATOSIS: ICD-10-CM

## 2023-09-14 DIAGNOSIS — Z71.89 OTHER SPECIFIED COUNSELING: ICD-10-CM

## 2023-09-14 DIAGNOSIS — L81.4 OTHER MELANIN HYPERPIGMENTATION: ICD-10-CM

## 2023-09-14 PROBLEM — D22.72 MELANOCYTIC NEVI OF LEFT LOWER LIMB, INCLUDING HIP: Status: ACTIVE | Noted: 2023-09-14

## 2023-09-14 PROBLEM — D22.5 MELANOCYTIC NEVI OF TRUNK: Status: ACTIVE | Noted: 2023-09-14

## 2023-09-14 PROCEDURE — ? COUNSELING

## 2023-09-14 PROCEDURE — 99213 OFFICE O/P EST LOW 20 MIN: CPT

## 2023-09-14 ASSESSMENT — LOCATION DETAILED DESCRIPTION DERM
LOCATION DETAILED: RIGHT MID-UPPER BACK
LOCATION DETAILED: LEFT LATERAL KNEE
LOCATION DETAILED: RIGHT SUPERIOR UPPER BACK

## 2023-09-14 ASSESSMENT — LOCATION ZONE DERM
LOCATION ZONE: TRUNK
LOCATION ZONE: LEG

## 2023-09-14 ASSESSMENT — LOCATION SIMPLE DESCRIPTION DERM
LOCATION SIMPLE: LEFT KNEE
LOCATION SIMPLE: RIGHT UPPER BACK

## 2023-09-25 ENCOUNTER — APPOINTMENT (OUTPATIENT)
Dept: SURGERY | Facility: MEDICAL CENTER | Age: 58
End: 2023-09-25
Payer: COMMERCIAL

## 2023-09-25 NOTE — PROGRESS NOTES
Primary care provider:  Slick ERAZO M.D.  Medical oncologist: Dr. Galvan  Radiation oncologist:  Dr. Ashraf     CC: No chief complaint on file.       HPI: This *** 58 y.o. female returns *** for routine postoperative appointment.      Allergies   Allergen Reactions    Pollen Extract      Current Outpatient Medications   Medication Sig    SPRAVATO, 56 MG DOSE, 28 MG/DEVICE Solution Therapy Pack     SPRAVATO, 84 MG DOSE, 28 MG/DEVICE Solution Therapy Pack     fluoxetine (PROZAC) 40 MG capsule Take 40 mg by mouth every day.    FLUoxetine (PROZAC) 20 MG Cap TAKE 1 CAPSULE BY MOUTH ONCE DAILY WITH 40 MG CAP FOR TOTAL OF 60 MG DAILY    levothyroxine (SYNTHROID) 100 MCG Tab     Ascorbic Acid (VITAMIN C) 1000 MG Tab Take  by mouth every day.    Ferrous Sulfate (IRON) 325 (65 Fe) MG Tab Take  by mouth every day.    B Complex Vitamins (VITAMIN B COMPLEX PO) Take  by mouth.         IMAGING:  ***    Physical Exam:  There were no vitals taken for this visit.    General: ***  Surgical site:  ***  ***See scanned diagram    1. 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 Pathologic Stage IA (pTlc pN0  MO). US biopsy 7/18/23 [facility]. EVERTON loc LEFT partial mastectomy with lattice, donut mastopexy,  SLNB with ARM 8/11/23 [facility].  ODx 10.  Adjuvant XRT, then endocrine therapy.    1.7cm. MARGINS CLEAR for both IDC and DCIS. (1.1cm on US.)  Grade 1. L VI not seen.  ER >90%. [place] 10-20%.  Ki-671-2%  HER2 LOW (2+ on IHC, negative by FISH).  0 of 2 sentinel nodes.  LCIS (non-pleomorphic) and ADH.    Screening mammo 6/26/23 [facility]: Scattered FGD. Last prior mammo 2021.    PGM with breast cancer in her 70's. No AJ heritage. Genetics referral sent to [facility] per patient preference.    No smoking history. Rare alcohol use. BMI 30.  Weekly low dose ketamine, intranasal.    DISCUSSED:  ***  ___ AI?  ___  HF without B, Pascale  ___ Genetics appt/test?  [facility] vs [facility]?      PLAN:  ***  ___ Pt advised to call in 4 months for SAF-2 around 6 months

## 2023-09-25 NOTE — PROGRESS NOTES
9/25/2023  8:56 AM    Primary care provider:  Slick ERAZO M.D.  Medical oncologist: Dr. Galvan  Radiation oncologist:  Dr. Ashraf     CC: No chief complaint on file.       HPI: This *** 58 y.o. female returns *** for routine postoperative appointment.      Allergies   Allergen Reactions    Pollen Extract      Current Outpatient Medications   Medication Sig    SPRAVATO, 56 MG DOSE, 28 MG/DEVICE Solution Therapy Pack     SPRAVATO, 84 MG DOSE, 28 MG/DEVICE Solution Therapy Pack     fluoxetine (PROZAC) 40 MG capsule Take 40 mg by mouth every day.    FLUoxetine (PROZAC) 20 MG Cap TAKE 1 CAPSULE BY MOUTH ONCE DAILY WITH 40 MG CAP FOR TOTAL OF 60 MG DAILY    levothyroxine (SYNTHROID) 100 MCG Tab     Ascorbic Acid (VITAMIN C) 1000 MG Tab Take  by mouth every day.    Ferrous Sulfate (IRON) 325 (65 Fe) MG Tab Take  by mouth every day.    B Complex Vitamins (VITAMIN B COMPLEX PO) Take  by mouth.         IMAGING:  ***    Physical Exam:  There were no vitals taken for this visit.    General: ***  Surgical site:  ***  ***See scanned diagram    1. 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 Pathologic Stage IA (pTlc pN0  MO). US biopsy 7/18/23 [facility]. EVERTON loc LEFT partial mastectomy with lattice, donut mastopexy,  SLNB with ARM 8/11/23 [facility].  ODx 10.  Adjuvant XRT, then endocrine therapy.    1.7cm. MARGINS CLEAR for both IDC and DCIS. (1.1cm on US.)  Grade 1. L VI not seen.  ER >90%. [place] 10-20%.  Ki-671-2%  HER2 LOW (2+ on IHC, negative by FISH).  0 of 2 sentinel nodes.  LCIS (non-pleomorphic) and ADH.    Screening mammo 6/26/23 [facility]: Scattered FGD. Last prior mammo 2021.    PGM with breast cancer in her 70's. No AJ heritage. Genetics referral sent to [facility] per patient preference.    No smoking history. Rare alcohol use. BMI 30.  Weekly low dose ketamine,  intranasal.    DISCUSSED:  ***  ___ AI?  ___ HF without B, Pascale  ___ Genetics appt/test?  [facility] vs [facility]?      PLAN:  ***  ___ Pt advised to call in 4 months for SAF-2 around 6 months

## 2023-12-04 ENCOUNTER — TELEPHONE (OUTPATIENT)
Dept: SURGERY | Facility: MEDICAL CENTER | Age: 58
End: 2023-12-04
Payer: COMMERCIAL

## 2023-12-04 NOTE — TELEPHONE ENCOUNTER
10/15/23 Patient aware to call Dr. Weiner's office in 4 months for a SAF-2 appointment in 6 months. Patient expressed appreciation for her care. Recall added.

## 2025-03-02 NOTE — ANESTHESIA PROCEDURE NOTES
Airway    Date/Time: 8/11/2023 2:18 PM    Performed by: Margarita Varghese M.D.  Authorized by: Margarita Varghese M.D.    Location:  OR  Urgency:  Elective  Indications for Airway Management:  Anesthesia      Spontaneous Ventilation: absent    Sedation Level:  Deep  Preoxygenated: Yes    Mask Difficulty Assessment:  0 - not attempted  Final Airway Type:  Supraglottic airway  Final Supraglottic Airway:  Standard LMA    SGA Size:  4  Number of Attempts at Approach:  1         fall

## (undated) DEVICE — SHEET TRANSVERSE LAP - (12EA/CA)

## (undated) DEVICE — SUTURE 4-0 MONOCRYL PLUS PS-2 - 27 INCH (36/BX)

## (undated) DEVICE — TOWEL STOP TIMEOUT SAFETY FLAG (40EA/CA)

## (undated) DEVICE — SODIUM CHL IRRIGATION 0.9% 1000ML (12EA/CA)

## (undated) DEVICE — LACTATED RINGERS INJ 1000 ML - (14EA/CA 60CA/PF)

## (undated) DEVICE — SPONGE GAUZESTER. 2X2 4-PL - (2/PK 50PK/BX 30BX/CS)

## (undated) DEVICE — CANISTER SUCTION RIGID RED 1500CC (40EA/CA)

## (undated) DEVICE — CANISTER SUCTION 3000ML MECHANICAL FILTER AUTO SHUTOFF MEDI-VAC NONSTERILE LF DISP  (40EA/CA)

## (undated) DEVICE — BLADE ELECTRODE COATED EDGE (50EA/PK)

## (undated) DEVICE — BINDER BREAST FLORAL PINK LARGE 36-40 (1EA)"

## (undated) DEVICE — KIT  I.V. START (100EA/CA)

## (undated) DEVICE — COVER CIV-FLEX TRANSDUCER - (24/BX)

## (undated) DEVICE — FIBERWIRE 4-0 - (12/BX)

## (undated) DEVICE — GOWN WARMING STANDARD FLEX - (30/CA)

## (undated) DEVICE — SENSOR OXIMETER ADULT SPO2 RD SET (20EA/BX)

## (undated) DEVICE — CANNULA O2 COMFORT SOFT EAR ADULT 7 FT TUBING (50/CA)

## (undated) DEVICE — SUCTION INSTRUMENT YANKAUER BULBOUS TIP W/O VENT (50EA/CA)

## (undated) DEVICE — CLOSURE SKIN STRIP 1/2 X 4 IN - (STERI STRIP) (50/BX 4BX/CA)

## (undated) DEVICE — PAD MAGNETIC INSTRUMENT FOAM HOLDER (200/CA)

## (undated) DEVICE — WATER IRRIGATION STERILE 1000ML (12EA/CA)

## (undated) DEVICE — PLUMEPEN ULTRA 3/8 IN X 10 FT HOSE (20EA/CA)

## (undated) DEVICE — SUTURE GENERAL

## (undated) DEVICE — SET LEADWIRE 5 LEAD BEDSIDE DISPOSABLE ECG (1SET OF 5/EA)

## (undated) DEVICE — SLEEVE VASO CALF MED - (10PR/CA)

## (undated) DEVICE — TUBE CONNECTING SUCTION - CLEAR PLASTIC STERILE 72 IN (50EA/CA)

## (undated) DEVICE — GLOVE BIOGEL INDICATOR SZ 6.5 SURGICAL PF LTX - (50PR/BX 4BX/CA)

## (undated) DEVICE — TUBING CLEARLINK DUO-VENT - C-FLO (48EA/CA)

## (undated) DEVICE — SUTURE 3-0 VICRYL PLUS SH - 8X 18 INCH (12/BX)

## (undated) DEVICE — Device

## (undated) DEVICE — SUTURE 4-0 30CM STRATAFIX SPIRAL PS-2 (12EA/BX)

## (undated) DEVICE — MASK OXYGEN VNYL ADLT MED CONC WITH 7 FOOT TUBING  - (50EA/CA)

## (undated) DEVICE — GLOVE BIOGEL SZ 6 PF LATEX - (50EA/BX 4BX/CA)

## (undated) DEVICE — SPONGE XRAY 8X4 STERL. 12PL - (10EA/TY 80TY/CA)